# Patient Record
Sex: MALE | Race: WHITE | NOT HISPANIC OR LATINO | URBAN - METROPOLITAN AREA
[De-identification: names, ages, dates, MRNs, and addresses within clinical notes are randomized per-mention and may not be internally consistent; named-entity substitution may affect disease eponyms.]

---

## 2017-05-08 ENCOUNTER — COMMUNICATION - HEALTHEAST (OUTPATIENT)
Dept: BEHAVIORAL HEALTH | Facility: CLINIC | Age: 67
End: 2017-05-08

## 2017-06-12 ENCOUNTER — OFFICE VISIT - HEALTHEAST (OUTPATIENT)
Dept: BEHAVIORAL HEALTH | Facility: CLINIC | Age: 67
End: 2017-06-12

## 2017-06-12 DIAGNOSIS — F06.30 MOOD DISORDER IN CONDITIONS CLASSIFIED ELSEWHERE: ICD-10-CM

## 2017-06-12 RX ORDER — GLUCOSAMINE HCL/CHONDROITIN SU 500-400 MG
CAPSULE ORAL
Status: SHIPPED | COMMUNITY
Start: 2016-05-09

## 2017-10-02 ENCOUNTER — OFFICE VISIT - HEALTHEAST (OUTPATIENT)
Dept: BEHAVIORAL HEALTH | Facility: CLINIC | Age: 67
End: 2017-10-02

## 2017-10-02 DIAGNOSIS — F06.30 MOOD DISORDER IN CONDITIONS CLASSIFIED ELSEWHERE: ICD-10-CM

## 2017-10-02 RX ORDER — OXYBUTYNIN CHLORIDE 15 MG/1
15 TABLET, EXTENDED RELEASE ORAL DAILY
Status: SHIPPED | COMMUNITY
Start: 2017-10-02

## 2017-10-02 ASSESSMENT — MIFFLIN-ST. JEOR: SCORE: 1985.5

## 2017-12-18 ENCOUNTER — COMMUNICATION - HEALTHEAST (OUTPATIENT)
Dept: BEHAVIORAL HEALTH | Facility: CLINIC | Age: 67
End: 2017-12-18

## 2018-04-23 ENCOUNTER — OFFICE VISIT - HEALTHEAST (OUTPATIENT)
Dept: BEHAVIORAL HEALTH | Facility: CLINIC | Age: 68
End: 2018-04-23

## 2018-04-23 DIAGNOSIS — F06.30 MOOD DISORDER IN CONDITIONS CLASSIFIED ELSEWHERE: ICD-10-CM

## 2018-04-23 ASSESSMENT — MIFFLIN-ST. JEOR: SCORE: 1912.92

## 2018-05-10 ENCOUNTER — COMMUNICATION - HEALTHEAST (OUTPATIENT)
Dept: BEHAVIORAL HEALTH | Facility: CLINIC | Age: 68
End: 2018-05-10

## 2018-07-09 ENCOUNTER — RECORDS - HEALTHEAST (OUTPATIENT)
Dept: LAB | Facility: CLINIC | Age: 68
End: 2018-07-09

## 2018-07-09 LAB
ANION GAP SERPL CALCULATED.3IONS-SCNC: 6 MMOL/L (ref 5–18)
BUN SERPL-MCNC: 19 MG/DL (ref 8–22)
CALCIUM SERPL-MCNC: 9.3 MG/DL (ref 8.5–10.5)
CHLORIDE BLD-SCNC: 106 MMOL/L (ref 98–107)
CO2 SERPL-SCNC: 28 MMOL/L (ref 22–31)
CREAT SERPL-MCNC: 0.64 MG/DL (ref 0.7–1.3)
GFR SERPL CREATININE-BSD FRML MDRD: >60 ML/MIN/1.73M2
GLUCOSE BLD-MCNC: 84 MG/DL (ref 70–125)
INR PPP: 2.28 (ref 0.9–1.1)
POTASSIUM BLD-SCNC: 4.2 MMOL/L (ref 3.5–5)
SODIUM SERPL-SCNC: 140 MMOL/L (ref 136–145)

## 2018-07-11 ENCOUNTER — RECORDS - HEALTHEAST (OUTPATIENT)
Dept: LAB | Facility: CLINIC | Age: 68
End: 2018-07-11

## 2018-07-12 LAB — INR PPP: 1.9 (ref 0.9–1.1)

## 2018-07-16 ENCOUNTER — RECORDS - HEALTHEAST (OUTPATIENT)
Dept: LAB | Facility: CLINIC | Age: 68
End: 2018-07-16

## 2018-07-16 LAB — INR PPP: 2.72 (ref 0.9–1.1)

## 2018-07-20 ENCOUNTER — RECORDS - HEALTHEAST (OUTPATIENT)
Dept: LAB | Facility: CLINIC | Age: 68
End: 2018-07-20

## 2018-07-23 LAB — INR PPP: 3.86 (ref 0.9–1.1)

## 2018-07-24 ENCOUNTER — RECORDS - HEALTHEAST (OUTPATIENT)
Dept: LAB | Facility: CLINIC | Age: 68
End: 2018-07-24

## 2018-07-24 LAB — INR PPP: 2.78 (ref 0.9–1.1)

## 2018-07-25 ENCOUNTER — RECORDS - HEALTHEAST (OUTPATIENT)
Dept: LAB | Facility: CLINIC | Age: 68
End: 2018-07-25

## 2018-07-25 LAB
ANION GAP SERPL CALCULATED.3IONS-SCNC: 5 MMOL/L (ref 5–18)
BASOPHILS # BLD AUTO: 0.1 THOU/UL (ref 0–0.2)
BASOPHILS NFR BLD AUTO: 1 % (ref 0–2)
BUN SERPL-MCNC: 12 MG/DL (ref 8–22)
CALCIUM SERPL-MCNC: 9.5 MG/DL (ref 8.5–10.5)
CHLORIDE BLD-SCNC: 100 MMOL/L (ref 98–107)
CO2 SERPL-SCNC: 31 MMOL/L (ref 22–31)
CREAT SERPL-MCNC: 0.68 MG/DL (ref 0.7–1.3)
EOSINOPHIL # BLD AUTO: 0.7 THOU/UL (ref 0–0.4)
EOSINOPHIL NFR BLD AUTO: 14 % (ref 0–6)
ERYTHROCYTE [DISTWIDTH] IN BLOOD BY AUTOMATED COUNT: 12.6 % (ref 11–14.5)
GFR SERPL CREATININE-BSD FRML MDRD: >60 ML/MIN/1.73M2
GLUCOSE BLD-MCNC: 89 MG/DL (ref 70–125)
HCT VFR BLD AUTO: 41.9 % (ref 40–54)
HGB BLD-MCNC: 13.8 G/DL (ref 14–18)
LYMPHOCYTES # BLD AUTO: 1.4 THOU/UL (ref 0.8–4.4)
LYMPHOCYTES NFR BLD AUTO: 27 % (ref 20–40)
MCH RBC QN AUTO: 29.7 PG (ref 27–34)
MCHC RBC AUTO-ENTMCNC: 32.9 G/DL (ref 32–36)
MCV RBC AUTO: 90 FL (ref 80–100)
MONOCYTES # BLD AUTO: 0.6 THOU/UL (ref 0–0.9)
MONOCYTES NFR BLD AUTO: 12 % (ref 2–10)
NEUTROPHILS # BLD AUTO: 2.2 THOU/UL (ref 2–7.7)
NEUTROPHILS NFR BLD AUTO: 45 % (ref 50–70)
PLATELET # BLD AUTO: 186 THOU/UL (ref 140–440)
PMV BLD AUTO: 10.2 FL (ref 8.5–12.5)
POTASSIUM BLD-SCNC: 4.4 MMOL/L (ref 3.5–5)
RBC # BLD AUTO: 4.64 MILL/UL (ref 4.4–6.2)
SODIUM SERPL-SCNC: 136 MMOL/L (ref 136–145)
WBC: 4.9 THOU/UL (ref 4–11)

## 2018-07-26 ENCOUNTER — RECORDS - HEALTHEAST (OUTPATIENT)
Dept: LAB | Facility: CLINIC | Age: 68
End: 2018-07-26

## 2018-07-27 LAB — INR PPP: 2.41 (ref 0.9–1.1)

## 2018-07-30 ENCOUNTER — RECORDS - HEALTHEAST (OUTPATIENT)
Dept: LAB | Facility: CLINIC | Age: 68
End: 2018-07-30

## 2018-07-30 LAB — INR PPP: 2.1 (ref 0.9–1.1)

## 2018-08-03 ENCOUNTER — RECORDS - HEALTHEAST (OUTPATIENT)
Dept: LAB | Facility: CLINIC | Age: 68
End: 2018-08-03

## 2018-08-06 LAB — INR PPP: 2.3 (ref 0.9–1.1)

## 2018-08-15 ENCOUNTER — RECORDS - HEALTHEAST (OUTPATIENT)
Dept: LAB | Facility: CLINIC | Age: 68
End: 2018-08-15

## 2018-08-16 LAB — INR PPP: 2.19 (ref 0.9–1.1)

## 2018-08-28 ENCOUNTER — RECORDS - HEALTHEAST (OUTPATIENT)
Dept: LAB | Facility: CLINIC | Age: 68
End: 2018-08-28

## 2018-08-29 LAB — INR PPP: 2.03 (ref 0.9–1.1)

## 2018-09-17 ENCOUNTER — COMMUNICATION - HEALTHEAST (OUTPATIENT)
Dept: BEHAVIORAL HEALTH | Facility: CLINIC | Age: 68
End: 2018-09-17

## 2018-09-18 ENCOUNTER — RECORDS - HEALTHEAST (OUTPATIENT)
Dept: LAB | Facility: CLINIC | Age: 68
End: 2018-09-18

## 2018-09-19 LAB — INR PPP: 2.96 (ref 0.9–1.1)

## 2021-05-26 ENCOUNTER — RECORDS - HEALTHEAST (OUTPATIENT)
Dept: ADMINISTRATIVE | Facility: CLINIC | Age: 71
End: 2021-05-26

## 2021-05-28 ENCOUNTER — RECORDS - HEALTHEAST (OUTPATIENT)
Dept: ADMINISTRATIVE | Facility: CLINIC | Age: 71
End: 2021-05-28

## 2021-05-31 VITALS — HEIGHT: 72 IN | WEIGHT: 264 LBS | BODY MASS INDEX: 35.76 KG/M2

## 2021-05-31 VITALS — HEIGHT: 72 IN | BODY MASS INDEX: 30.64 KG/M2

## 2021-06-01 ENCOUNTER — RECORDS - HEALTHEAST (OUTPATIENT)
Dept: ADMINISTRATIVE | Facility: CLINIC | Age: 71
End: 2021-06-01

## 2021-06-01 VITALS — BODY MASS INDEX: 33.59 KG/M2 | HEIGHT: 72 IN | WEIGHT: 248 LBS

## 2021-06-11 NOTE — PROGRESS NOTES
Pt here for follow up    Per here with house staff and no concerns stated.  Updated pharmacy to [facility].

## 2021-06-11 NOTE — PROGRESS NOTES
Correct pharmacy verified with patient and confirmed in snapshot? [x] yes []no    Medications Phoned  to Pharmacy [] yes [x]no  Name of Pharmacist:  List Medications, including dose, quantity and instructions      Medication Prescriptions given to patient   [] yes  [x] no   List the name of the drug the prescription was written for.       Medications ordered this visit were e-scribed.  Verified by order class [] yes  [x] no    Medication changes or discontinuations were communicated to patient's pharmacy: [] yes  [x] no    UA collected [] yes    [x] no    [place] Prescription Monitoring Program Reviewed? [] yes  [x] no    Referrals were made to:  none    Future appointment was made: [] yes  [x] no    Dictation completed at time of chart check: [x] yes  [] no    I have checked the documentation for today s encounters and the above information has been reviewed and completed.

## 2021-06-11 NOTE — PROGRESS NOTES
Outpatient Followup Psychiatric Evaluation      Pertinent History: Patient has a history of cognitive disorder described as dementia as well as mood instability related to a prior brain injury.  He presents today with his group home staff member for the purposes of medication management.  We have recently increased the patient's antidepressant due to ongoing aggressiveness and sexually inappropriate behavior.  Patient has poor boundaries.  He is tolerated that change.    Current Symptoms:   Patient is an extremely poor historian but he does not identify any new issues or concerns.  The staff report the patient doing fairly well.  He is much improved with regard to his behavior since the increase in the antidepressant.  He still has some impulsivity and says inappropriate things.  He asked our nurse if she was going to kiss him today.    No hallucinations or delusions.  He sleeping fairly well.  No obvious side effects to the medication.  There is no other health concerns.  The staff is requesting continuing with the recent medication adjustments.  The patient believes.    Current Medications: Please see chart. Medications personally reviewed.    Medication Compliance: Yes     Side Effects to Medications:  no      Vitals:  Wt Readings from Last 3 Encounters:   12/12/16 (!) 225 lb 14.4 oz (102.5 kg)   12/08/16 (!) 228 lb 3.2 oz (103.5 kg)   11/21/16 (!) 236 lb (107 kg)     Temp Readings from Last 3 Encounters:   12/12/16 98.1  F (36.7  C)   12/08/16 97.9  F (36.6  C)   11/21/16 97.4  F (36.3  C)     BP Readings from Last 3 Encounters:   12/12/16 127/70   12/08/16 123/72   11/21/16 115/74     Pulse Readings from Last 3 Encounters:   12/12/16 90   12/08/16 86   11/21/16 78         Mental Status Exam:   Patient is psychomotor slowed.  He is flat.  He is not a slow and flat with limited eye contact.  Not agitated or anxious.  No restlessness.  No shortness of breath or pain.  Speech is rare slow simple and vague.  Mood is  not depressed but he is flat and disinterested.  There is no anxiety or lability at this time.  Attention and concentration are baseline.  He needs constant prompts to participate in even then is quite vague.  Thought content does not show any hallucinations or delusions.  No suicidal or homicidal ideation.  Thought formation does not show the patient loose.  Slow and concrete.  Insight, judgment and memory fund of knowledge are all baseline impaired and unchanged.    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      Plan:  Medication Adjustment:  I will make no changes at this time.    Other:   Patient will return in 3 months for med check.  The staff agree to call prior to that if any questions concerns or problems arise.    Continue with the support of the clinic, reassurance, and redirection. Staff monitoring and ongoing assessments per team plan. Current psychotropic medication appears to represent the minimum effective dosage and appears medically necessary. We will continue to monitor and reassess. This team will utilize appropriate emergency services if necessary. I will make myself available if concerns or problems arise.    Bhavesh Cardozo

## 2021-06-13 NOTE — PROGRESS NOTES
Correct pharmacy verified with patient and confirmed in snapshot? [x] yes []no    Charge captured ? [x] yes  [] no    Medications Phoned  to Pharmacy [] yes [x]no  Name of Pharmacist:  List Medications, including dose, quantity and instructions      Medication Prescriptions given to patient   [] yes  [x] no   List the name of the drug the prescription was written for.       Medications ordered this visit were e-scribed.  Verified by order class [] yes  [x] no    Medication changes or discontinuations were communicated to patient's pharmacy: [] yes  [x] no    UA collected [] yes  [x] no    [place] Prescription Monitoring Program Reviewed? [] yes  [x] no    Referrals were made to:  none    Future appointment was made: [x] yes  [] no    Dictation completed at time of chart check: [x] yes  [] no    I have checked the documentation for today s encounters and the above information has been reviewed and completed.

## 2021-06-13 NOTE — PROGRESS NOTES
Outpatient Followup Psychiatric Evaluation      Pertinent History: Patient has a history of cognitive disorder described as dementia as well as mood instability related to a prior brain injury.  He presents today with his group home staff member for the purposes of medication management.  No medication changes at the last visit.  Prior to that we did increase his antidepressant.  Apparently since the last visit another physician discontinued the Lasix and Keppra.    Current Symptoms:   Patient is a vague historian but minimizes any concerns or complaints.  He denies having any depression or anxiety.  He reports he sleeping well.  No change in cognition.  No medical concerns and no side effects to the medication    I spoke with the staff report the patient is doing well.  They voiced no specific concerns or complaints.  They denied any need for any medication changes and they have noted no side effects to medication.  No other questions.    Current Medications: Please see chart. Medications personally reviewed.    Medication Compliance: Yes     Side Effects to Medications:  no      Vitals:  Wt Readings from Last 3 Encounters:   10/02/17 (!) 264 lb (119.7 kg)   12/12/16 (!) 225 lb 14.4 oz (102.5 kg)   12/08/16 (!) 228 lb 3.2 oz (103.5 kg)     Temp Readings from Last 3 Encounters:   12/12/16 98.1  F (36.7  C)   12/08/16 97.9  F (36.6  C)   11/21/16 97.4  F (36.3  C)     BP Readings from Last 3 Encounters:   10/02/17 146/84   06/12/17 106/67   12/12/16 127/70     Pulse Readings from Last 3 Encounters:   10/02/17 73   06/12/17 78   12/12/16 90         Mental Status Exam:   Alert and slow.  No obvious distress.  No pain.  No shortness of breath.  No agitation or restlessness.  Speech was slow monotone simple and concrete.  Not pressured or rambling.  He was baseline impaired with attention and concentration but able to follow simple conversation.  Mood was not obviously depressed but affect is flat.  Thought content does  not show any hallucinations or delusions.  No suicidal or homicidal ideation thought formation does not show the patient to be loose.  Insight, judgment and memory are all baseline impaired and unchanged.  His baseline impaired.    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      Plan:  Medication Adjustment:  I will make no changes at this time.    Other:   Patient will return in 4-6 months for med check.  The staff agree to call prior to that if any questions concerns or problems arise.    Continue with the support of the clinic, reassurance, and redirection. Staff monitoring and ongoing assessments per team plan. Current psychotropic medication appears to represent the minimum effective dosage and appears medically necessary. We will continue to monitor and reassess. This team will utilize appropriate emergency services if necessary. I will make myself available if concerns or problems arise.    Bhavesh Cardozo

## 2021-06-13 NOTE — PROGRESS NOTES
Pt here for follow up with 1 staff member.      No behaviors that need addressing.     Medications updated and Lasix and Keppra were both dc'd by another provider.

## 2021-06-17 NOTE — PROGRESS NOTES
Pt here for follow up.    Per staff patient has been doing well. No behaviors to report.    Sleep has been good.  Eats well

## 2021-06-17 NOTE — PROGRESS NOTES
Outpatient Followup Psychiatric Evaluation      Pertinent History: Patient has a history of cognitive disorder described as dementia as well as mood instability related to a prior brain injury.  He presents today with his group home staff member for the purposes of medication management.  No medication changes at the last visit.  In 2017 we did increase his antidepressant.  Patient continues to follow up with neurology for his underlying seizure disorder.    Current Symptoms:   Patient again is vague and unable to participate much.  He denies however having any concerns or complaints.  The staff present reports the patient is doing well.  No depression or anxiety.  No psychosis.  He seems relatively comfortable.  They have noted no side effects to the medication.  The patient was hospitalized briefly a couple of weeks ago with a UTI and that has cleared up and he is doing well.  They voiced no other questions or concerns and do not feel medication changes are necessary.    Current Medications: Please see chart. Medications personally reviewed.    Medication Compliance: Yes     Side Effects to Medications:  no      Vitals:  Wt Readings from Last 3 Encounters:   04/23/18 (!) 248 lb (112.5 kg)   10/02/17 (!) 264 lb (119.7 kg)   12/12/16 (!) 225 lb 14.4 oz (102.5 kg)     Temp Readings from Last 3 Encounters:   12/12/16 98.1  F (36.7  C)   12/08/16 97.9  F (36.6  C)   11/21/16 97.4  F (36.3  C)     BP Readings from Last 3 Encounters:   04/23/18 133/74   10/02/17 146/84   06/12/17 106/67     Pulse Readings from Last 3 Encounters:   04/23/18 76   10/02/17 73   06/12/17 78         Mental Status Exam:   Appearance: Patient appeared flat and slow.  No obvious pain or distress.  Not restless or agitated.  Behavior: Calm.  Directable.  Limited initiation.  Flat and slow.  No tremor.  Speech: Rare, simple and vague.  Not pressured  Mood/Affect: Flat.  Possibly depressed but not anxious.  Not irritable or labile.  Thought  Content: No psychosis  Suicidal or Homicidal Thoughts: None  Thought Process/Formulation: Slow and concrete.  Simple and vague.  No racing thoughts  Associations: Impaired  Fund of Knowledge: Impaired  Attention/Concentration: Distractible and disinterested  Insight: Impaired  Judgement: Impaired  Memory: Impaired with limited effort  Motor Status: No tremor.  No recent change  Orientation: Not able to demonstrate that he is oriented.  Minimally participating.    Diagnosis managed and treated at today's visit :    Major neurocognitive disorder and mood disorder secondary to brain injury     Plan:  Medication Adjustment:  I will make no changes at this time.    Other:   Patient will return in 4-6 months for med check.  The staff agree to call prior to that if any questions concerns or problems arise.    Continue with the support of the clinic, reassurance, and redirection. Staff monitoring and ongoing assessments per team plan. Current psychotropic medication appears to represent the minimum effective dosage and appears medically necessary. We will continue to monitor and reassess. This team will utilize appropriate emergency services if necessary. I will make myself available if concerns or problems arise.    Bhavesh Cardozo MD

## 2021-06-17 NOTE — PROGRESS NOTES
Correct pharmacy verified with patient and confirmed in snapshot? [x] yes []no    Charge captured ? [x] yes  [] no    Medications Phoned  to Pharmacy [] yes [x]no  Name of Pharmacist:  List Medications, including dose, quantity and instructions      Medication Prescriptions given to patient   [] yes  [x] no   List the name of the drug the prescription was written for.       Medications ordered this visit were e-scribed.  Verified by order class [] yes  [x] no    Medication changes or discontinuations were communicated to patient's pharmacy: [] yes  [x] no    UA collected [] yes  [x] no    [place] Prescription Monitoring Program Reviewed? [] yes  [x] no    Referrals were made to:  None    Future appointment was made: [x] yes  [] no  09/17/18  Dictation completed at time of chart check: [x] yes  [] no    I have checked the documentation for today s encounters and the above information has been reviewed and completed.